# Patient Record
Sex: FEMALE | Race: AMERICAN INDIAN OR ALASKA NATIVE
[De-identification: names, ages, dates, MRNs, and addresses within clinical notes are randomized per-mention and may not be internally consistent; named-entity substitution may affect disease eponyms.]

---

## 2018-05-21 ENCOUNTER — HOSPITAL ENCOUNTER (INPATIENT)
Dept: HOSPITAL 5 - ED | Age: 53
LOS: 2 days | Discharge: HOME | DRG: 558 | End: 2018-05-23
Attending: INTERNAL MEDICINE | Admitting: INTERNAL MEDICINE
Payer: COMMERCIAL

## 2018-05-21 DIAGNOSIS — D69.6: ICD-10-CM

## 2018-05-21 DIAGNOSIS — I10: ICD-10-CM

## 2018-05-21 DIAGNOSIS — Z90.10: ICD-10-CM

## 2018-05-21 DIAGNOSIS — A08.4: ICD-10-CM

## 2018-05-21 DIAGNOSIS — I16.0: ICD-10-CM

## 2018-05-21 DIAGNOSIS — M62.82: Primary | ICD-10-CM

## 2018-05-21 DIAGNOSIS — Z90.49: ICD-10-CM

## 2018-05-21 DIAGNOSIS — Z92.21: ICD-10-CM

## 2018-05-21 DIAGNOSIS — Z85.3: ICD-10-CM

## 2018-05-21 LAB
ALBUMIN SERPL-MCNC: 3.7 G/DL (ref 3.9–5)
ALT SERPL-CCNC: 22 UNITS/L (ref 7–56)
BACTERIA #/AREA URNS HPF: (no result) /HPF
BASOPHILS # (AUTO): 0 K/MM3 (ref 0–0.1)
BASOPHILS NFR BLD AUTO: 0.4 % (ref 0–1.8)
BILIRUB DIRECT SERPL-MCNC: < 0.2 MG/DL (ref 0–0.2)
BILIRUB UR QL STRIP: (no result)
BLOOD UR QL VISUAL: (no result)
BUN SERPL-MCNC: 11 MG/DL (ref 7–17)
BUN/CREAT SERPL: 14 %
CALCIUM SERPL-MCNC: 8.8 MG/DL (ref 8.4–10.2)
EOSINOPHIL # BLD AUTO: 0 K/MM3 (ref 0–0.4)
EOSINOPHIL NFR BLD AUTO: 0.1 % (ref 0–4.3)
HCT VFR BLD CALC: 38.6 % (ref 30.3–42.9)
HEMOLYSIS INDEX: 26
HGB BLD-MCNC: 12.6 GM/DL (ref 10.1–14.3)
LIPASE SERPL-CCNC: 13 UNITS/L (ref 13–60)
LYMPHOCYTES # BLD AUTO: 1.6 K/MM3 (ref 1.2–5.4)
LYMPHOCYTES NFR BLD AUTO: 20.3 % (ref 13.4–35)
MCH RBC QN AUTO: 28 PG (ref 28–32)
MCHC RBC AUTO-ENTMCNC: 33 % (ref 30–34)
MCV RBC AUTO: 85 FL (ref 79–97)
MONOCYTES # (AUTO): 0.4 K/MM3 (ref 0–0.8)
MONOCYTES % (AUTO): 5.3 % (ref 0–7.3)
PH UR STRIP: 7 [PH] (ref 5–7)
PLATELET # BLD: 138 K/MM3 (ref 140–440)
PROT UR STRIP-MCNC: (no result) MG/DL
RBC # BLD AUTO: 4.53 M/MM3 (ref 3.65–5.03)
RBC #/AREA URNS HPF: 1 /HPF (ref 0–6)
UROBILINOGEN UR-MCNC: < 2 MG/DL (ref ?–2)
WBC #/AREA URNS HPF: < 1 /HPF (ref 0–6)

## 2018-05-21 PROCEDURE — 80074 ACUTE HEPATITIS PANEL: CPT

## 2018-05-21 PROCEDURE — 85025 COMPLETE CBC W/AUTO DIFF WBC: CPT

## 2018-05-21 PROCEDURE — 81001 URINALYSIS AUTO W/SCOPE: CPT

## 2018-05-21 PROCEDURE — 82553 CREATINE MB FRACTION: CPT

## 2018-05-21 PROCEDURE — 93005 ELECTROCARDIOGRAM TRACING: CPT

## 2018-05-21 PROCEDURE — 93010 ELECTROCARDIOGRAM REPORT: CPT

## 2018-05-21 PROCEDURE — 80048 BASIC METABOLIC PNL TOTAL CA: CPT

## 2018-05-21 PROCEDURE — 84484 ASSAY OF TROPONIN QUANT: CPT

## 2018-05-21 PROCEDURE — 85007 BL SMEAR W/DIFF WBC COUNT: CPT

## 2018-05-21 PROCEDURE — 84703 CHORIONIC GONADOTROPIN ASSAY: CPT

## 2018-05-21 PROCEDURE — 74176 CT ABD & PELVIS W/O CONTRAST: CPT

## 2018-05-21 PROCEDURE — 82550 ASSAY OF CK (CPK): CPT

## 2018-05-21 PROCEDURE — 83690 ASSAY OF LIPASE: CPT

## 2018-05-21 PROCEDURE — 87045 FECES CULTURE AEROBIC BACT: CPT

## 2018-05-21 PROCEDURE — 36415 COLL VENOUS BLD VENIPUNCTURE: CPT

## 2018-05-21 NOTE — CAT SCAN REPORT
FINAL REPORT



EXAM:  CT ABDOMEN PELVIS WO CON



HISTORY:  abdominal pain, n/v/d 1month 



TECHNIQUE:  Standard unenhanced CT of the abdomen and pelvis.

Coronal and sagittal reconstruction was also performed.



PRIORS:  None.



FINDINGS:  

Within the abdomen, the liver, spleen, pancreas, gallbladder,

adrenal glands, and kidneys are unremarkable. No evidence for

retroperitoneal or pelvic lymphadenopathy is seen. No evidence

for renal or ureteral calculi or hydronephrosis is present

bilaterally. 



Moderate stool is present throughout the colon. The bowel loops

have normal caliber. No soft tissue mass, fluid collection,

inflammatory change, or free air is seen within the abdomen or

pelvis. 



The appendix is carlos in size but contains at least 2 focal

calcifications suggesting small appendicoliths l.



Within the pelvis, the bladder is unremarkable. The uterus is

normal. No evidence for mass or lymphadenopathy is seen in the

pelvis. 



Images through the upper abdomen include the lung bases which are

expanded and clear. Left breast implant is in place. Small hiatal

hernia is seen. 



Bony structures show facet joint degenerative changes at L5-S1.



IMPRESSION:  

1. no acute intra-abdominal process noted. 



2. Two appendicoliths are noted within the appendix which is

otherwise normal in appearance.

## 2018-05-21 NOTE — EMERGENCY DEPARTMENT REPORT
HPI





- General


Chief Complaint: Dizziness


Time Seen by Provider: 05/21/18 17:38





- HPI


HPI: 








The patient is a 52-year-old female with a significant history of breast cancer

, on chemotherapy, who presents for evaluation of dizziness and vomiting.  The 

patient states that at noon earlier today, approximately 4 hours prior to my 

evaluation, she experienced the insidious onset of lightheadedness, constant 

since onset, currently mild in severity, exacerbated with position changes, and 

associated with nausea and 3 episodes of nonbilious, nonbloody emesis. The 

patient denies fever, head injury, headache, neck pain, neck stiffness, chest 

pain, abdominal pain, vision or hearing changes, smell or taste changes, 

paresthesias, facial drooping, slurred speech, seizure-like activity, urine or 

bowel incontinence or retention, or other focal neurological deficit.








ED Past Medical Hx





- Past Medical History


Hx Hypertension: Yes


Hx of Cancer: Yes (breast)





- Surgical History


Additional Surgical History: breast reduction





- Social History


Smoking Status: Never Smoker





ED Review of Systems


ROS: 


Stated complaint: HYPERTENSION


Other details as noted in HPI








Constitutional: reports dizziness denies: fever


ENT: denies: throat or neck pain


Respiratory: denies: cough, shortness of breath


Cardiovascular: denies: chest pain


Endocrine: denies unexplained weight loss or gain


Gastrointestinal: denies: abdominal pain reports nausea and vomiting


Genitourinary: denies: dysuria


Musculoskeletal: denies: leg swelling


Skin: denies: rash


Neurological: denies: headache


Hematological/Lymphatic: denies: easy bleeding or easy bruising  


Psych: denies sadness or hopelessness














Physical Exam





- Physical Exam


Vital Signs: 


 Vital Signs











  05/21/18 05/21/18 05/21/18





  17:18 19:34 19:45


 


Pulse Rate 56 L 51 L 51 L


 


Respiratory 18 16 14





Rate   


 


Blood Pressure 182/92  177/91


 


O2 Sat by Pulse 100  





Oximetry   














  05/21/18





  20:00


 


Pulse Rate 51 L


 


Respiratory 13





Rate 


 


Blood Pressure 179/92


 


O2 Sat by Pulse 





Oximetry 











Physical Exam: 








General: well-nourished, well-developed, no acute distress


Head: Normocephalic, atraumatic


Eyes: normal sclera


ENT: Mucous membranes are pale and dry


Neck: No neck stiffness, no cervical adenopathy


Respiratory: Breath sounds equal bilaterally, no wheezing, rales, or rhonchi


Cardio: S1 and S2 present, no murmurs, rubs, gallops, capillary refill is 

delayed


Abdomen: Normoactive bowel sounds, soft abdomen, no rigidity, no guarding or 

rebound tenderness


Chest WALL/Back: No tenderness to palpation of the chest wall, no CVA 

tenderness with percussion


Musc: No pitting edema


Skin: No rash


Neuro: alert oriented x4, normal cognition, speech normal, PERRL, EOM intact, 

no facial drooping, no uvula or tongue deviation on protrusion, no deficit with 

rotation of neck or shoulder shrug, no obvious gross motor deficit in the upper 

or lower extremities with flexion or extension at the shoulder, elbow, wrist, 

hip, knee, or ankle bilaterally, no obvious gross sensation deficit to crude 

touch or 2 pt discrimination, 2+ symmetric reflexes on DTR testing, no 

coordination deficit with finger-to-nose or heel-to-shin testing, Babinski 

downgoing, romberg negative, patient able to to ambulate without abnormal gait


Psych: Normal affect








ED Course


 Vital Signs











  05/21/18 05/21/18 05/21/18





  17:18 19:34 19:45


 


Pulse Rate 56 L 51 L 51 L


 


Respiratory 18 16 14





Rate   


 


Blood Pressure 182/92  177/91


 


O2 Sat by Pulse 100  





Oximetry   














  05/21/18





  20:00


 


Pulse Rate 51 L


 


Respiratory 13





Rate 


 


Blood Pressure 179/92


 


O2 Sat by Pulse 





Oximetry 














ED Medical Decision Making





- Lab Data


Result diagrams: 


 05/21/18 17:44





 05/21/18 17:44





- Medical Decision Making





The patient was seen and examined by myself.  The patient is placed on a 

cardiac monitor and continuous pulse ox. On initial evaluation, the patient was 

found to be in no distress.  Evaluation orders are placed.  IV access is 

established and the patient is given 500cc normal saline fluid bolus and Zofran 

for nausea. Lab results revealed elevated CK level of 1500, consistent with 

acute mild rhabdomyolysis, and otherwise labs were unrevealing, including WBC, 

hemoglobin, hematocrit, electrolytes, renal function, LFTs, lipase.  The 

patient is given additional normal saline fluid bolus for treatment of her 

rhabdomyolysis. Dr. Wilkinson the on-call [place] physician was contacted.  She 

agreed to arrange transfer the patient to [facility].  She 

contacted  physician Dr. May, whom agreed to admit the patient.  The patient 

was transferred to [facility] in guarded condition. 


Critical care attestation.: 


If time is entered above; I have spent that time in minutes in the direct care 

of this critically ill patient, excluding procedure time.








ED Disposition


Clinical Impression: 


 Hypertensive urgency, Orthostatic dizziness, Nausea and vomiting in adult





Rhabdomyolysis


Qualifiers:


 Rhabdomyolysis type: non-traumatic Qualified Code(s): M62.82 - Rhabdomyolysis





Disposition: DC/TX-70 ANOTHER TYPE HLTHCARE


Is pt being admited?: No


Does the pt Need Aspirin: No


Condition: Fair


Referrals: 


PRIMARY CARE,MD [Primary Care Provider] - 3-5 Days


Time of Disposition: 20:30

## 2018-05-22 RX ADMIN — Medication SCH ML: at 23:27

## 2018-05-22 RX ADMIN — SODIUM CHLORIDE SCH MLS/HR: 0.9 INJECTION, SOLUTION INTRAVENOUS at 02:45

## 2018-05-22 RX ADMIN — ACETAMINOPHEN PRN MG: 325 TABLET ORAL at 04:13

## 2018-05-22 RX ADMIN — Medication SCH ML: at 11:26

## 2018-05-22 RX ADMIN — ENOXAPARIN SODIUM SCH MG: 100 INJECTION SUBCUTANEOUS at 11:25

## 2018-05-22 RX ADMIN — SODIUM CHLORIDE SCH MLS/HR: 0.9 INJECTION, SOLUTION INTRAVENOUS at 19:40

## 2018-05-22 NOTE — HISTORY AND PHYSICAL REPORT
History of Present Illness


Date of examination: 05/22/18


History of present illness: 


52-year-old woman with a history of breast cancer comes emergency room with 

complaints of nausea and vomiting and diarrhea that started on Friday.  She 

felt better on Saturday and went to work but today her symptoms return.  Also 

complaining of left shoulder pain over the llast 2 days.  Also complaining of 

feeling dizzy, no recent antibiotic use.  Patient returned from [facility] one 

month ago





Review of systems


Constitutional: no  weight loss, chills


Ears, eyes, nose, mouth and throat: no nasal congestion, no nasal discharge, no 

sinus pressure, no vision change, no red eye.


Neck: No neck pain or rigidity.


Cardiovascular: no chest pain, palpitations


Respiratory: No cough, shortness of breath


Gastrointestinal: no abdominal pain, hematochezia


Genitourinary : no dysuria, frequency , no hematuria


Musculoskeletal: no joint swelling or muscle ache 


Integumentary: no rash, no pruritis


Neurological: no parathesias, no numbness, no focal weakness


Endocrine: no cold or heat intolerance, no polyuria or polydipsia


Hematologic/Lymphatic: no easy bruising, no easy bleeding, no gland swelling


Allergic/Immunologic: no urticaria, no angioedema.





PAST MEDICAL HISTORY: Breast cancer





PAST SURGICAL HISTORY: Masectomy





SOCIAL HISTORY: Denies alcohol, tobacco, drugs





FAMILY HISTORY: Extension








Medications and Allergies


 Allergies











Allergy/AdvReac Type Severity Reaction Status Date / Time


 


Iodinated Contrast- Oral and Allergy  Unknown Verified 05/21/18 17:21





IV Dye     


 


hydromorphone [From Dilaudid] AdvReac  Itching Verified 05/21/18 17:21











Active Meds: 


Active Medications





Acetaminophen (Tylenol)  650 mg PO Q4H PRN


   PRN Reason: Pain MILD(1-3)/Fever >100.5/HA


Enoxaparin Sodium (Lovenox)  30 mg SUB-Q QDAY [place]


Sodium Chloride (Nacl 0.9% 1000 Ml)  1,000 mls @ 150 mls/hr IV AS DIRECT [place]


Morphine Sulfate (Morphine)  2 mg IV Q4H PRN


   PRN Reason: Pain, Moderate (4-6)


Ondansetron HCl (Zofran)  4 mg IV Q4H PRN


   PRN Reason: Nausea And Vomiting


Sodium Chloride (Sodium Chloride Flush Syringe 10 Ml)  10 ml IV BID [place]


Sodium Chloride (Sodium Chloride Flush Syringe 10 Ml)  10 ml IV PRN PRN


   PRN Reason: LINE FLUSH











Exam





- Physical Exam


Narrative exam: 


Gen. appearance: Patient lying in bed, no apparent distress


HEENT: Normocephalic, atraumatic, pupils equally round and reactive to light, 

extraocular movement intact, and no sclericterus,. No JVD or thyromegaly or 

nodule,neck supple, no carotid bruit ,mucous membranes moist, no exudate or 

erythema


Heart: S1, S2, regular rate and rhythm


Lungs: Clear to auscultation bilaterally, breathing comfortable


Abdomen: Positive bowel sounds, nontender, nondistended, no organomegaly


Extremity: No edema, cyanosis, clubbing


Skin: No rash, nodules, warm, dry


Neuro: Oriented 3, cranial nerves II-12 intact, speech is fluent, motor and 

sensory intact








- Constitutional


Vitals: 


 











Temp Pulse Resp BP Pulse Ox


 


    50 L  13   179/100   100 


 


    05/21/18 21:35  05/21/18 20:00  05/21/18 21:35  05/21/18 17:18














Results





- Labs


CBC & Chem 7: 


 05/21/18 17:44





 05/21/18 17:44


Labs: 


 Abnormal lab results











  05/21/18 05/21/18 05/21/18 Range/Units





  17:44 17:44 17:44 


 


RDW  16.5 H    (13.2-15.2)  %


 


Plt Count  138 L    (140-440)  K/mm3


 


Seg Neutrophils %  73.9 H    (40.0-70.0)  %


 


Carbon Dioxide   21 L   (22-30)  mmol/L


 


Glucose   110 H   ()  mg/dL


 


Total Creatine Kinase   1585 H   ()  units/L


 


Albumin    3.7 L  (3.9-5)  g/dL














- Imaging and Cardiology


CT scan - abdomen: report reviewed


CT scan - pelvis: report reviewed





Assessment and Plan


Assessment


Gastroenteritis, ? viral


Left arm pain


History of breast cancer


thrombocytopenia








Plan


Admit to medicine


Start IV fluids, check stool for cultures


Obtain x-ray of the lef shoulder, check cardiac enzymes


DVT prophylaxis

## 2018-05-22 NOTE — XRAY REPORT
LEFT SHOULDER, 3 views:



History: Pain, breast cancer.



Routine views demonstrate normal bony and soft tissue structures

with normal joint alignment of the shoulder. No bony lesion is 

appreciated on x-ray.



IMPRESSION:

Left shoulder within normal limits.

## 2018-05-23 VITALS — SYSTOLIC BLOOD PRESSURE: 179 MMHG | DIASTOLIC BLOOD PRESSURE: 97 MMHG

## 2018-05-23 LAB
BASOPHILS # (AUTO): 0 K/MM3 (ref 0–0.1)
BASOPHILS NFR BLD AUTO: 0.1 % (ref 0–1.8)
BUN SERPL-MCNC: 7 MG/DL (ref 7–17)
BUN/CREAT SERPL: 9 %
CALCIUM SERPL-MCNC: 8.4 MG/DL (ref 8.4–10.2)
EOSINOPHIL # BLD AUTO: 0.1 K/MM3 (ref 0–0.4)
EOSINOPHIL NFR BLD AUTO: 1 % (ref 0–4.3)
HCT VFR BLD CALC: 39.1 % (ref 30.3–42.9)
HEMOLYSIS INDEX: 1
HGB BLD-MCNC: 12.5 GM/DL (ref 10.1–14.3)
LYMPHOCYTES # BLD AUTO: 2.5 K/MM3 (ref 1.2–5.4)
LYMPHOCYTES NFR BLD AUTO: 45.1 % (ref 13.4–35)
MCH RBC QN AUTO: 27 PG (ref 28–32)
MCHC RBC AUTO-ENTMCNC: 32 % (ref 30–34)
MCV RBC AUTO: 85 FL (ref 79–97)
MONOCYTES # (AUTO): 0.5 K/MM3 (ref 0–0.8)
MONOCYTES % (AUTO): 8.5 % (ref 0–7.3)
PLATELET # BLD: 124 K/MM3 (ref 140–440)
RBC # BLD AUTO: 4.58 M/MM3 (ref 3.65–5.03)

## 2018-05-23 RX ADMIN — SODIUM CHLORIDE SCH MLS/HR: 0.9 INJECTION, SOLUTION INTRAVENOUS at 02:43

## 2018-05-23 RX ADMIN — ACETAMINOPHEN PRN MG: 325 TABLET ORAL at 08:44

## 2018-05-23 RX ADMIN — ENOXAPARIN SODIUM SCH MG: 100 INJECTION SUBCUTANEOUS at 10:14

## 2018-05-23 RX ADMIN — Medication SCH ML: at 10:19

## 2018-05-23 NOTE — DISCHARGE SUMMARY
Providers





- Providers


Date of Admission: 


05/22/18 00:17





Date of discharge: 05/23/18


Attending physician: 


SYLVIA PAYAN





Primary care physician: 


PRIMARY CARE MD








Hospitalization


Condition: Stable


Hospital course: 


Patient is 53 yo woman with a history of left breast cancer on chemotherapy and 

hypertension who pw n/v/d which has resolved. Also, pt has history of travel to 

[facility] 1 month ago. She was admitted for rhabdo, cpk 1585. D/w Dr. To from [place]. 





-Acute viral AGE: treat symptomatically


-Rhabdomyolysis: serial cpk, which i ordered today, slightly improved


-Breast cancer: continue home regimen


-hypertension, uncontrolled on nss at 150ml/hr


-DVT prophylaxis: sq lovenox


full code


disposition: d/c once cpk levels under 1000








Disposition: DC-01 TO HOME OR SELFCARE


Time spent for discharge: 34 minutes





Core Measure Documentation





- Palliative Care


Palliative Care/ Comfort Measures: Not Applicable





- Core Measures


Any of the following diagnoses?: none





- VTE Discharge Requirements


Deep Vein Thrombosis/Pulmonary Embolism Present on Admission: No


Has pt received <5 days of overlap therapy or INR<2.0: No


Anticoagulant overlap therapy prescribed at discharge: No


Contraindication No Overlap Therapy order at DC: Not Indicated





Exam





- Physical Exam


Narrative exam: 


bp rechecked after nss at 150ml/hr stopped was 162/84





GEN: WDWN, NAD, Awake, Alert, Orientated 


HEENT: NCAT, EOMI, PERRL, OP Clear 


NECK: supple, no adenopathy, no thyromegaly, no JVD 


CVS/HEART: RRR, normal S1S2, pulses present bilaterally 


CHEST/LUNGS: CTA B, Symmetrical chest expansion, good air entry bilaterally 


GI/Abdomen: soft, NTND, good bowel sounds, no guarding or rebound 


/Bladder: no suprapubic tenderness, no CVA or paraspinal tenderness 


EXT/Skin: no c/c/e, no obvious rash 


MSK: FROM x 4 


Neuro: CN 2-12 grossly intact, no new focal deficits 


Psych: calm








- Constitutional


Vitals: 


 











Temp Pulse Resp BP Pulse Ox


 


 98.4 F   56 L  20   201/105   99 


 


 05/23/18 07:11  05/23/18 08:00  05/23/18 08:44  05/23/18 07:11  05/23/18 07:11














Plan


Activity: other (no strenous activity until cleared by pcp)


Diet: low salt


Special Instructions: record daily BP diary


Additional Instructions: see [place] PCP as soon as possible


Follow up with: 


PRIMARY CARE,MD [Primary Care Provider] - 3-5 Days